# Patient Record
Sex: MALE | Race: WHITE | NOT HISPANIC OR LATINO | Employment: FULL TIME | URBAN - METROPOLITAN AREA
[De-identification: names, ages, dates, MRNs, and addresses within clinical notes are randomized per-mention and may not be internally consistent; named-entity substitution may affect disease eponyms.]

---

## 2020-12-23 ENCOUNTER — TRANSFERRED RECORDS (OUTPATIENT)
Dept: HEALTH INFORMATION MANAGEMENT | Facility: CLINIC | Age: 45
End: 2020-12-23

## 2021-01-15 ENCOUNTER — TRANSFERRED RECORDS (OUTPATIENT)
Dept: HEALTH INFORMATION MANAGEMENT | Facility: CLINIC | Age: 46
End: 2021-01-15

## 2021-01-28 ENCOUNTER — TRANSFERRED RECORDS (OUTPATIENT)
Dept: HEALTH INFORMATION MANAGEMENT | Facility: CLINIC | Age: 46
End: 2021-01-28

## 2021-02-09 ENCOUNTER — TRANSCRIBE ORDERS (OUTPATIENT)
Dept: OTHER | Age: 46
End: 2021-02-09

## 2021-02-09 DIAGNOSIS — G45.9 TIA (TRANSIENT ISCHEMIC ATTACK): Primary | ICD-10-CM

## 2021-03-03 ENCOUNTER — PRE VISIT (OUTPATIENT)
Dept: NEUROLOGY | Facility: CLINIC | Age: 46
End: 2021-03-03

## 2021-03-03 NOTE — TELEPHONE ENCOUNTER
FUTURE VISIT INFORMATION      FUTURE VISIT INFORMATION:    Date: 3/8/2021    Time: 430pm    Location: [facility]  REFERRAL INFORMATION:    Referring provider:  Dr. Holbrook    Referring providers clinic:  [facility]     Reason for visit/diagnosis  TIA    RECORDS REQUESTED FROM:       Clinic name Comments Records Status Imaging Status   [facility]   Requested  Requested                                    3/3/2021-Request for records and Images faxed to [facility] @ 351pm    3/8/2021-2nd request for Records and Images faxed to [facility], Voicemail left with Medical Records at [facility] @ 520am    Action 3/11/21 MV 4.19pm   Action Taken Imaging disk received from [facility] via mail. Sent to  for processing. Scanned in records to chart.    Images on disk:  MRI Brain 1/15/21

## 2021-03-26 ENCOUNTER — OFFICE VISIT (OUTPATIENT)
Dept: NEUROLOGY | Facility: CLINIC | Age: 46
End: 2021-03-26
Attending: PSYCHIATRY & NEUROLOGY
Payer: COMMERCIAL

## 2021-03-26 ENCOUNTER — TELEPHONE (OUTPATIENT)
Dept: NEUROLOGY | Facility: CLINIC | Age: 46
End: 2021-03-26

## 2021-03-26 VITALS
HEART RATE: 79 BPM | OXYGEN SATURATION: 97 % | RESPIRATION RATE: 16 BRPM | DIASTOLIC BLOOD PRESSURE: 89 MMHG | HEIGHT: 67 IN | BODY MASS INDEX: 36.57 KG/M2 | SYSTOLIC BLOOD PRESSURE: 137 MMHG | WEIGHT: 233 LBS

## 2021-03-26 DIAGNOSIS — G44.209 TENSION HEADACHE: ICD-10-CM

## 2021-03-26 DIAGNOSIS — Z82.3 FAMILY HISTORY OF STROKE (CEREBROVASCULAR): ICD-10-CM

## 2021-03-26 DIAGNOSIS — R29.818 TRANSIENT NEUROLOGICAL SYMPTOMS: Primary | ICD-10-CM

## 2021-03-26 PROCEDURE — 99204 OFFICE O/P NEW MOD 45 MIN: CPT | Performed by: PSYCHIATRY & NEUROLOGY

## 2021-03-26 RX ORDER — ALBUTEROL SULFATE 90 UG/1
1-2 AEROSOL, METERED RESPIRATORY (INHALATION)
COMMUNITY
Start: 2021-03-10

## 2021-03-26 RX ORDER — ASPIRIN 325 MG
325 TABLET ORAL
COMMUNITY
Start: 2020-12-23

## 2021-03-26 RX ORDER — BECLOMETHASONE DIPROPIONATE HFA 80 UG/1
1 AEROSOL, METERED RESPIRATORY (INHALATION)
COMMUNITY
Start: 2021-02-26

## 2021-03-26 RX ORDER — ATORVASTATIN CALCIUM 20 MG/1
20 TABLET, FILM COATED ORAL
COMMUNITY
Start: 2020-12-29

## 2021-03-26 RX ORDER — GABAPENTIN 100 MG/1
CAPSULE ORAL
Qty: 180 CAPSULE | Refills: 1 | Status: SHIPPED | OUTPATIENT
Start: 2021-03-26 | End: 2021-04-30 | Stop reason: DRUGHIGH

## 2021-03-26 ASSESSMENT — MIFFLIN-ST. JEOR: SCORE: 1895.51

## 2021-03-26 ASSESSMENT — PAIN SCALES - GENERAL: PAINLEVEL: MILD PAIN (3)

## 2021-03-26 NOTE — TELEPHONE ENCOUNTER
GENETIC COUNSELING-Neurology  I left a message for Abhay to schedule genetic consult for family history of CADASIL at the request of Dr. Vernon. I offered appointment times next Friday 4/2 and asked him to call me back.    Kelby Galicia MS, [facility]  Certified Genetic Couselor

## 2021-03-26 NOTE — LETTER
3/26/2021       RE: Abhay Escudero  [address] [place] [address]     Dear Colleague,    Thank you for referring your patient, Abhay Escudero, to the [facility] NEUROLOGY CLINIC [place] at [facility]. Please see a copy of my visit note below.    Service Date: 2021      Alberto Lagos, [facility]   [address] [place]  [address]      RE: Abhay Escudero   MRN: [id number]   : 1975      Dear Dr. Lagos:      I saw your patient, Abhay Escudero on 2021.  He is a 46-year-old left-handed male referred for evaluation of transient neurologic events with headache.  He also had an abnormal brain MRI scan.      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  He did recover from this.      On 2020, he had a transient left-sided sensory disturbance.  It started in his hand and then spread up to his jaw and tongue.  This lasted about 10-15 minutes and he did get a headache afterwards.  He was not confused with this.  He did go to [facility].  He was evaluated for possible transient ischemic attack.  He had a negative head CAT scan at that time and CT angiograms of the head and neck vessels were normal.  He was started on aspirin and atorvastatin.  He may have had an episode a week earlier.  While notes indicate that it was his right hand, he cannot recall specifically if it was his right or his left hand.      He was subsequently seen at the [facility] by Dr. Barbie Holbrook.  He had a brain MRI scan that I was able to review.  He has encephalomalacia and gliosis involving the right frontal lobe and superior temporal gyrus.  This I suspect is residual from his previous intracerebral hemorrhage.  There are also supratentorial white matter changes that likely represent chronic small vessel  ischemic change.  I do not appreciate any anterior temporal lesions.  There is no evidence of an acute stroke or hemorrhage on the study.      Subsequent testing included a negative transthoracic echocardiogram.  He also had a 30-day ACT monitor placed.  I do not have the actual results, but I do have a note from [facility]--Dr. Hunter.  There is no mention of atrial fibrillation, but he did have a wide complex ventricular tachycardia and he is going to be further evaluated for that apparently.      He had another event on 03/13/2021.  It started in the ulnar aspect of his left hand and then spread to the whole hand and then up his arm to his lips, gum and tongue and then his trunk.  He had a headache after this.  He went to [facility] and had a head CAT scan done and there was no evidence of any acute abnormalities.  Since then, he has had a pressure sensation in the top of his head.      He does have a history of a severe headache about 7 years ago that he thinks may have been a migraine.  It was so bad he blacked out with this.      His family history is relevant.  His mother and sister have both been diagnosed with CADASIL.  His mother had strokes.  His sister apparently had abnormalities on her brain MRI scan that were initially felt to represent multiple sclerosis, but she was subsequently, according to the patient, tested positive for CADASIL.  He also has a maternal aunt with dementia and a maternal grandmother who was diagnosed with Alzheimer disease.      PAST MEDICAL HISTORY:  Otherwise, notable for cholecystectomy, left shoulder surgery, and asthma.      CURRENT MEDICATIONS:   1.  Albuterol.   2.  Aspirin 325 mg.   3.  Atorvastatin 20 mg.   4.  Qvar.      ALLERGIES:  No known medical allergies.      FAMILY HISTORY:  As noted above.      SOCIAL HISTORY:  He is currently out of work.  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  He quit using tobacco in December.  He is not using  alcohol currently.      PHYSICAL EXAMINATION:  Examination reveals the patient was alert and cooperative.  Heart rate 79 and regular.  Blood pressure 137/89.      Pupils are equal, round and react well to light.  He has full extraocular motility.  Visual fields are intact.  Otherwise, cranial nerves II-XII are intact.  Motor examination reveals normal strength.  Sensory examination is intact to position, vibration and double simultaneous sensory stimuli.  Cerebellar function is normal.  Gait is normal.  Reflexes are 2+.  Plantar responses are flexor.      IMPRESSION:   1.  Transient neurologic symptoms (sensory) followed by headache.   2.  Abnormal brain MRI scan revealing residual right hemisphere changes from intracerebral hemorrhage as well as chronic small vessel disease.   3.  Family history of CADASIL.      PLAN:  I did discuss further evaluation.  The transient events could well represent migraine.  I think less likely they represent simple partial seizures, but this is a possibility.  There is a concern about CADASIL given his family history.      For further evaluation, he is going to have CADASIL gene testing done.  He is aware that this is an inherited disorder, and given that it is autosomal dominant, he does have a 50% chance of inheriting this disorder.  He would like to  pursue this testing and I am referring him to our , Kelby Galicia, for that purpose.      He is going to have a 3-hour EEG done looking to see if there is any evidence of epileptiform activity arising from the right hemisphere.      He reports low-grade ongoing headaches since the event a couple weeks ago.  I have prescribed gabapentin starting at 100 mg 3 times a day with the latitude to increase to 200 mg 3 times a day after a week.  I reviewed potential side effects.      He should continue on aspirin and atorvastatin.      I did  him that attempts should be made to keep his blood pressure less than 130/80.      I am  going to be seeing him back in a month.      Sincerely,      Serjio Vernon MD      Total visit time was 50 minutes.  This included review of his available medical records prior to the visit, history, examination, counseling, care coordination and documentation.         SERJIO VERNON MD             D: 2021   T: 2021   MT: chiara      Name:     SHAILESH PICKETT   MRN:      [id number]        Account:      [id number]   :      1975           Service Date: 2021      Document: [id number]

## 2021-03-26 NOTE — LETTER
3/26/2021       RE: bAhay Escudero  [address] [place] [address]     Dear Colleague,    Thank you for referring your patient, Abhay Escudero, to the [facility] NEUROLOGY CLINIC [place] at [facility]. Please see a copy of my visit note below.    Service Date: 2021      Alberto Lagos, [facility]   [address] [place]  [address]      RE: Abhay Escudero   MRN: [id number]   : 1975      Dear Dr. Lagos:      I saw your patient, Abhay Escudero on 2021.  He is a 46-year-old left-handed male referred for evaluation of transient neurologic events with headache.  He also had an abnormal brain MRI scan.      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  He did recover from this.      On 2020, he had a transient left-sided sensory disturbance.  It started in his hand and then spread up to his jaw and tongue.  This lasted about 10-15 minutes and he did get a headache afterwards.  He was not confused with this.  He did go to [facility].  He was evaluated for possible transient ischemic attack.  He had a negative head CAT scan at that time and CT angiograms of the head and neck vessels were normal.  He was started on aspirin and atorvastatin.  He may have had an episode a week earlier.  While notes indicate that it was his right hand, he cannot recall specifically if it was his right or his left hand.      He was subsequently seen at the [facility] by Dr. Barbie Holbrook.  He had a brain MRI scan that I was able to review.  He has encephalomalacia and gliosis involving the right frontal lobe and superior temporal gyrus.  This I suspect is residual from his previous intracerebral hemorrhage.  There are also supratentorial white matter changes that likely represent chronic small vessel  ischemic change.  I do not appreciate any anterior temporal lesions.  There is no evidence of an acute stroke or hemorrhage on the study.      Subsequent testing included a negative transthoracic echocardiogram.  He also had a 30-day ACT monitor placed.  I do not have the actual results, but I do have a note from [facility]--Dr. Hunter.  There is no mention of atrial fibrillation, but he did have a wide complex ventricular tachycardia and he is going to be further evaluated for that apparently.      He had another event on 03/13/2021.  It started in the ulnar aspect of his left hand and then spread to the whole hand and then up his arm to his lips, gum and tongue and then his trunk.  He had a headache after this.  He went to [facility] and had a head CAT scan done and there was no evidence of any acute abnormalities.  Since then, he has had a pressure sensation in the top of his head.      He does have a history of a severe headache about 7 years ago that he thinks may have been a migraine.  It was so bad he blacked out with this.      His family history is relevant.  His mother and sister have both been diagnosed with CADASIL.  His mother had strokes.  His sister apparently had abnormalities on her brain MRI scan that were initially felt to represent multiple sclerosis, but she was subsequently, according to the patient, tested positive for CADASIL.  He also has a maternal aunt with dementia and a maternal grandmother who was diagnosed with Alzheimer disease.      PAST MEDICAL HISTORY:  Otherwise, notable for cholecystectomy, left shoulder surgery, and asthma.      CURRENT MEDICATIONS:   1.  Albuterol.   2.  Aspirin 325 mg.   3.  Atorvastatin 20 mg.   4.  Qvar.      ALLERGIES:  No known medical allergies.      FAMILY HISTORY:  As noted above.      SOCIAL HISTORY:  He is currently out of work.  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  He quit using tobacco in December.  He is not using  alcohol currently.      PHYSICAL EXAMINATION:  Examination reveals the patient was alert and cooperative.  Heart rate 79 and regular.  Blood pressure 137/89.      Pupils are equal, round and react well to light.  He has full extraocular motility.  Visual fields are intact.  Otherwise, cranial nerves II-XII are intact.  Motor examination reveals normal strength.  Sensory examination is intact to position, vibration and double simultaneous sensory stimuli.  Cerebellar function is normal.  Gait is normal.  Reflexes are 2+.  Plantar responses are flexor.      IMPRESSION:   1.  Transient neurologic symptoms (sensory) followed by headache.   2.  Abnormal brain MRI scan revealing residual right hemisphere changes from intracerebral hemorrhage as well as chronic small vessel disease.   3.  Family history of CADASIL.      PLAN:  I did discuss further evaluation.  The transient events could well represent migraine.  I think less likely they represent simple partial seizures, but this is a possibility.  There is a concern about CADASIL given his family history.      For further evaluation, he is going to have CADASIL gene testing done.  He is aware that this is an inherited disorder, and given that it is autosomal dominant, he does have a 50% chance of inheriting this disorder.  He would like to  pursue this testing and I am referring him to our , Kelby Galicia, for that purpose.      He is going to have a 3-hour EEG done looking to see if there is any evidence of epileptiform activity arising from the right hemisphere.      He reports low-grade ongoing headaches since the event a couple weeks ago.  I have prescribed gabapentin starting at 100 mg 3 times a day with the latitude to increase to 200 mg 3 times a day after a week.  I reviewed potential side effects.      He should continue on aspirin and atorvastatin.      I did  him that attempts should be made to keep his blood pressure less than 130/80.      I am  going to be seeing him back in a month.      Sincerely,      Serjio Vernon MD      Total visit time was 50 minutes.  This included review of his available medical records prior to the visit, history, examination, counseling, care coordination and documentation.         SERJIO VERNON MD             D: 2021   T: 2021   MT: chiara      Name:     SHAILESH PICKETT   MRN:      [id number]        Account:      [id number]   :      1975           Service Date: 2021      Document: [id number]

## 2021-03-26 NOTE — PROGRESS NOTES
Service Date: 2021      Alberto Lagos, [facility]   [address] [place]  [address]      RE: Abhay Escudero   MRN: [id number]   : 1975      Dear Dr. Lagos:      I saw your patient, Abhay Escudero on 2021.  He is a 46-year-old left-handed male referred for evaluation of transient neurologic events with headache.  He also had an abnormal brain MRI scan.      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  He did recover from this.      On 2020, he had a transient left-sided sensory disturbance.  It started in his hand and then spread up to his jaw and tongue.  This lasted about 10-15 minutes and he did get a headache afterwards.  He was not confused with this.  He did go to [facility].  He was evaluated for possible transient ischemic attack.  He had a negative head CAT scan at that time and CT angiograms of the head and neck vessels were normal.  He was started on aspirin and atorvastatin.  He may have had an episode a week earlier.  While notes indicate that it was his right hand, he cannot recall specifically if it was his right or his left hand.      He was subsequently seen at the [facility] by Dr. Barbie Holbrook.  He had a brain MRI scan that I was able to review.  He has encephalomalacia and gliosis involving the right frontal lobe and superior temporal gyrus.  This I suspect is residual from his previous intracerebral hemorrhage.  There are also supratentorial white matter changes that likely represent chronic small vessel ischemic change.  I do not appreciate any anterior temporal lesions.  There is no evidence of an acute stroke or hemorrhage on the study.      Subsequent testing included a negative transthoracic echocardiogram.  He also had a 30-day ACT monitor placed.  I do not have the actual results, but I do have a note from [facility]--Dr. Hunter.  There is no mention of atrial fibrillation, but he did have a wide complex ventricular tachycardia and he is going to be further evaluated for that apparently.      He had another event on 03/13/2021.  It started in the ulnar aspect of his left hand and then spread to the whole hand and then up his arm to his lips, gum and tongue and then his trunk.  He had a headache after this.  He went to [facility] and had a head CAT scan done and there was no evidence of any acute abnormalities.  Since then, he has had a pressure sensation in the top of his head.      He does have a history of a severe headache about 7 years ago that he thinks may have been a migraine.  It was so bad he blacked out with this.      His family history is relevant.  His mother and sister have both been diagnosed with CADASIL.  His mother had strokes.  His sister apparently had abnormalities on her brain MRI scan that were initially felt to represent multiple sclerosis, but she was subsequently, according to the patient, tested positive for CADASIL.  He also has a maternal aunt with dementia and a maternal grandmother who was diagnosed with Alzheimer disease.      PAST MEDICAL HISTORY:  Otherwise, notable for cholecystectomy, left shoulder surgery, and asthma.      CURRENT MEDICATIONS:   1.  Albuterol.   2.  Aspirin 325 mg.   3.  Atorvastatin 20 mg.   4.  Qvar.      ALLERGIES:  No known medical allergies.      FAMILY HISTORY:  As noted above.      SOCIAL HISTORY:  He is currently out of work.  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  He is not using alcohol currently.      PHYSICAL EXAMINATION:  Examination reveals the patient was alert and cooperative.  Heart rate 79 and regular.  Blood pressure 137/89.      Pupils are equal, round and react well to light.  He has full extraocular motility.  Visual fields are intact.  Otherwise, cranial nerves II-XII are intact.   Motor examination reveals normal strength.  Sensory examination is intact to position, vibration and double simultaneous sensory stimuli.  Cerebellar function is normal.  Gait is normal.  Reflexes are 2+.  Plantar responses are flexor.      IMPRESSION:   1.  Transient neurologic symptoms (sensory) followed by headache.   2.  Abnormal brain MRI scan revealing residual right hemisphere changes from intracerebral hemorrhage as well as chronic small vessel disease.   3.  Family history of CADASIL.      PLAN:  I did discuss further evaluation.  The transient events could well represent migraine.  I think less likely they represent simple partial seizures, but this is a possibility.  There is a concern about CADASIL given his family history.      For further evaluation, he is going to have CADASIL gene testing done.  He is aware that this is an inherited disorder, and given that it is autosomal dominant, he does have a 50% chance of inheriting this disorder.  He would like to  pursue this testing and I am referring him to our , Kelby Galicia, for that purpose.      He is going to have a 3-hour EEG done looking to see if there is any evidence of epileptiform activity arising from the right hemisphere.      He reports low-grade ongoing headaches since the event a couple weeks ago.  I have prescribed gabapentin starting at 100 mg 3 times a day with the latitude to increase to 200 mg 3 times a day after a week.  I reviewed potential side effects.      He should continue on aspirin and atorvastatin.      I did  him that attempts should be made to keep his blood pressure less than 130/80.      I am going to be seeing him back in a month.      Sincerely,      Serjio Vernon MD      Total visit time was 50 minutes.  This included review of his available medical records prior to the visit, history, examination, counseling, care coordination and documentation.         SERJIO VERNON MD             D: 03/26/2021   T:  2021   MT: chiara      Name:     SHAILESH PICKETT   MRN:      [id number]        Account:      [id number]   :      1975           Service Date: 2021      Document: [id number]

## 2021-03-29 ENCOUNTER — TELEPHONE (OUTPATIENT)
Dept: NEUROLOGY | Facility: CLINIC | Age: 46
End: 2021-03-29

## 2021-03-29 NOTE — TELEPHONE ENCOUNTER
GENETIC COUNSELIG-Neurology  I left a second message for Abhay offering appointment times this Friday or Friday April 16.      Kelby Galicia MS, [facility]  Certified Genetic Counselor

## 2021-04-28 ENCOUNTER — TELEPHONE (OUTPATIENT)
Dept: NEUROLOGY | Facility: CLINIC | Age: 46
End: 2021-04-28

## 2021-04-28 NOTE — TELEPHONE ENCOUNTER
I called pt to check if he had done his EEG outside of [place]. If so we would like to get the results prior to his follow up Friday with dr. Vernon. Pt said he has not scheduled this test yet. I asked if he would like for me to have our  call him to help set up testing. He agreed and will expect the call. I will update Dr. Vernon.     Mely MORALES

## 2021-04-30 ENCOUNTER — OFFICE VISIT (OUTPATIENT)
Dept: NEUROLOGY | Facility: CLINIC | Age: 46
End: 2021-04-30
Payer: COMMERCIAL

## 2021-04-30 VITALS
HEART RATE: 100 BPM | DIASTOLIC BLOOD PRESSURE: 90 MMHG | BODY MASS INDEX: 37.12 KG/M2 | SYSTOLIC BLOOD PRESSURE: 132 MMHG | RESPIRATION RATE: 16 BRPM | WEIGHT: 237 LBS | OXYGEN SATURATION: 96 %

## 2021-04-30 DIAGNOSIS — G44.209 TENSION HEADACHE: ICD-10-CM

## 2021-04-30 PROCEDURE — 99213 OFFICE O/P EST LOW 20 MIN: CPT | Performed by: PSYCHIATRY & NEUROLOGY

## 2021-04-30 RX ORDER — GABAPENTIN 300 MG/1
300 CAPSULE ORAL 3 TIMES DAILY
Qty: 90 CAPSULE | Refills: 6 | Status: SHIPPED | OUTPATIENT
Start: 2021-04-30 | End: 2021-05-26

## 2021-04-30 ASSESSMENT — PAIN SCALES - GENERAL: PAINLEVEL: MILD PAIN (3)

## 2021-04-30 NOTE — PATIENT INSTRUCTIONS
Increase the Gabapentin to 300 mg 3 times a day  You can use up your 100 mg capsules by taking 3 of them 3 times a day  Your new Gabapentin prescription is for 300 mg capsules so you would take one of these 3 times a day

## 2021-04-30 NOTE — PROGRESS NOTES
"Service Date: 04/30/2021    REASON FOR VISIT:  Shailesh Ryder returns for followup.  He is a patient I saw a month ago for transient neurologic events and an abnormal brain MRI scan.  There is a family history of CADASIL.    His brain MRI scan did show evidence of a prior right intracerebral hemorrhage as well as chronic small vessel changes.  His transient neurologic symptoms were mainly sensory followed by headache and I suspected these were migrainous in nature.    The plan was for him to have a 3-hour EEG to rule out partial seizures, but he had to cancel that appointment, but it has been rescheduled for 05/20/2021.    He has not followed through with the CADASIL testing.  He was referred to our , Kelby Galicia for that purpose.  He does have Kelby Galicia's number and is going to be contacting him.    After his visit with me, he decided to try gabapentin because he was having low-grade headaches.  He is now on 200 mg 3 times a day and he describes as headaches now as \"light.\" He has not had any of the sensory symptoms with these and seems to be tolerating the gabapentin well.    PLAN:  He is going to try increasing the gabapentin to 300 mg 3 times a day.    He does have a 3-hour EEG scheduled for 05/20/2021, and I will communicate that result to him.    He is going to get in touch with Kelby Galicia concerning CADASIL testing.    I am going to be seeing him back formally in 2 months.    Total visit time today was 15 minutes.  This included reviewing history, counseling and documentation.    ADDENDUM 5/26/21: EEG normal. Called patient with results. Headaches good but now has edema hands and feet likely due to Gabapentin. Will reduce dose to 300 mg twice a day. He has F/U with me on July 2 . He has not, as of yet, scheduled appointment with Kelby Galicia.     Serjio Vernon MD        D: 04/30/2021   T: 04/30/2021   MT: NICHELLE    Name:     SHAILESH PICKETT  MRN:      [id number]        Account:      [id number]   :      1975           Service Date: 2021       Document: [id number]

## 2021-04-30 NOTE — LETTER
"4/30/2021       RE: Shailesh Escudero  [address]  [place] [address]     Dear Colleague,    Thank you for referring your patient, Shailesh Escudero, to the [facility] NEUROLOGY CLINIC [place] at [facility]. Please see a copy of my visit note below.    Service Date: 04/30/2021    REASON FOR VISIT:  Shailesh Ryder returns for followup.  He is a patient I saw a month ago for transient neurologic events and an abnormal brain MRI scan.  There is a family history of CADASIL.    His brain MRI scan did show evidence of a prior right intracerebral hemorrhage as well as chronic small vessel changes.  His transient neurologic symptoms were mainly sensory followed by headache and I suspected these were migrainous in nature.    The plan was for him to have a 3-hour EEG to rule out partial seizures, but he had to cancel that appointment, but it has been rescheduled for 05/20/2021.    He has not followed through with the CADASIL testing.  He was referred to our , Kelby Galicia for that purpose.  He does have Kelby Galicia's number and is going to be contacting him.    After his visit with me, he decided to try gabapentin because he was having low-grade headaches.  He is now on 200 mg 3 times a day and he describes as headaches now as \"light.\" He has not had any of the sensory symptoms with these and seems to be tolerating the gabapentin well.    PLAN:  He is going to try increasing the gabapentin to 300 mg 3 times a day.    He does have a 3-hour EEG scheduled for 05/20/2021, and I will communicate that result to him.    He is going to get in touch with Kelby Galicia concerning CADASIL testing.    I am going to be seeing him back formally in 2 months.    Total visit time today was 15 minutes.  This included reviewing history, counseling and documentation.    Serjio Vernon MD        D: 04/30/2021   T: 04/30/2021   MT: NICHELLE    Name:     SHAILESH ESCUDERO.  MRN:      " [id number]        Account:      [id number]   :      1975           Service Date: 2021       Document: [id number]

## 2021-05-20 ENCOUNTER — ANCILLARY PROCEDURE (OUTPATIENT)
Dept: NEUROLOGY | Facility: CLINIC | Age: 46
End: 2021-05-20
Attending: PSYCHIATRY & NEUROLOGY
Payer: COMMERCIAL

## 2021-05-20 DIAGNOSIS — R29.818 TRANSIENT NEUROLOGICAL SYMPTOMS: ICD-10-CM

## 2021-05-20 PROCEDURE — 95713 VEEG 2-12 HR CONT MNTR: CPT | Performed by: PSYCHIATRY & NEUROLOGY

## 2021-05-20 PROCEDURE — 95718 EEG PHYS/QHP 2-12 HR W/VEEG: CPT | Performed by: PSYCHIATRY & NEUROLOGY

## 2021-05-20 PROCEDURE — 95700 EEG CONT REC W/VID EEG TECH: CPT | Performed by: PSYCHIATRY & NEUROLOGY

## 2021-05-26 DIAGNOSIS — G44.209 TENSION HEADACHE: ICD-10-CM

## 2021-05-26 RX ORDER — GABAPENTIN 300 MG/1
300 CAPSULE ORAL
Qty: 90 CAPSULE | Refills: 6 | Status: SHIPPED | OUTPATIENT
Start: 2021-05-26 | End: 2021-07-02 | Stop reason: SINTOL

## 2021-07-02 ENCOUNTER — OFFICE VISIT (OUTPATIENT)
Dept: NEUROLOGY | Facility: CLINIC | Age: 46
End: 2021-07-02

## 2021-07-02 VITALS
SYSTOLIC BLOOD PRESSURE: 149 MMHG | BODY MASS INDEX: 38.87 KG/M2 | HEART RATE: 76 BPM | RESPIRATION RATE: 16 BRPM | OXYGEN SATURATION: 97 % | WEIGHT: 248.2 LBS | DIASTOLIC BLOOD PRESSURE: 94 MMHG

## 2021-07-02 DIAGNOSIS — G43.119 INTRACTABLE MIGRAINE WITH AURA WITHOUT STATUS MIGRAINOSUS: Primary | ICD-10-CM

## 2021-07-02 PROCEDURE — 99213 OFFICE O/P EST LOW 20 MIN: CPT | Performed by: PSYCHIATRY & NEUROLOGY

## 2021-07-02 RX ORDER — TOPIRAMATE 25 MG/1
TABLET, FILM COATED ORAL
Qty: 90 TABLET | Refills: 2 | Status: SHIPPED | OUTPATIENT
Start: 2021-07-02 | End: 2021-09-10

## 2021-07-02 ASSESSMENT — PAIN SCALES - GENERAL: PAINLEVEL: NO PAIN (0)

## 2021-07-02 NOTE — PROGRESS NOTES
Service Date: 2021    Alberto Lagos, [facility]  [address] [place]  [address]    RE:  Abhay Escudero  MRN:  [id number]  :  1975    Dear Dr. Lagos:    Abhay Escudero returns for followup.  He is a patient who has had at least 3 episodes of transient sensory disturbance followed by headache.  At least 2 of these were on the left side in the past.  Initial concerns were possible transient ischemic attack.  Workup for this was unrevealing.  He also has a history of right intracerebral hemorrhage in .  Brain MRI scan showed encephalomalacia and gliosis involving the right frontal lobe and superior temporal gyrus likely related to his previous intracerebral hemorrhage as well as supratentorial white matter changes.  It is notable there is a positive family history of CADASIL affecting his mother and sister.  He has not yet had genetic testing for this.    Since I last saw him, he had a 3-hour EEG done and the study was normal.    He tells me he had another episode about a month ago. On this occasion, it was right sided.  He had numbness starting in his right hand and then gradually spreading up his right arm.  This lasted about 10-15 minutes and he had a headache afterwards.  He is still having frequent pressure headaches.    He is on gabapentin, but he is gaining weight and is troubled by edema.  His current dose is 300 mg twice a day.    Other medications include albuterol, aspirin, atorvastatin, and Qvar.    PHYSICAL EXAMINATION:  Examination reveals his heart rate is 76.  Blood pressure 149/94.    Pupils are equal, round, and react well to light.  Visual fields are intact.  Otherwise, cranial nerves II-XII are intact.  Motor examination reveals normal strength.  Sensory examination is intact.  There is no evidence of cortical sensory loss.  Cerebellar function is normal.  Gait is normal.  Reflexes are 1 to 2+ in the upper extremities and 2+ in the lower  extremities.  Plantar responses are flexor.    IMPRESSION:    1.  Recurrent sensory disturbance with headache, which I suspect are migrainous phenomenon.  2.  Family history of CADASIL.    PLAN:  He is having side effects from gabapentin that he finds intolerable.  He is going to reduce the dose to 300 mg a day for a week and then stop.    I discussed a trial of Topamax.  I reviewed potential side effects.  He does not have any absolute contraindication such as glaucoma or kidney stones.    He is going to start on Topamax 25 mg a day for a week and then he can go to 50 mg a day for a week and then 75 mg.    I am retiring after today.  I am going to refer him to the Headache Clinic for ongoing management.    He does plan at some point to get in touch with Kelby Galicia regarding CADASIL testing.    I asked him to follow-up with you regarding his blood pressure.     Total visit time today was 20 minutes.  This included reviewing history, examination, counseling, and documentation.    Sincerely,    Serjio Vernon MD    cc:  Barbie Holbrook MD  [facility]  [address] [place]  [address]    Serjio Vernon MD        D: 2021   T: 2021   MT: chiara    Name:     SHAILESH PICKETT  MRN:      [id number]        Account:      [id number]   :      1975           Service Date: 2021       Document: [id number]

## 2021-07-02 NOTE — LETTER
2021       RE: Abhay Escudero  [address]  [place] [address]     Dear Colleague,    Thank you for referring your patient, Abhay Escudero, to the [facility] NEUROLOGY CLINIC [place] at [facility]. Please see a copy of my visit note below.    Service Date: 2021    Alberto Lagos, [facility]  [address] [place]  [address]    RE:  Abhay Escudero  MRN:  [id number]  :  1975    Dear Dr. Lagos:    Abhay Escudero returns for followup.  He is a patient who has had at least 3 episodes of transient sensory disturbance followed by headache.  At least 2 of these were on the left side in the past.  Initial concerns were possible transient ischemic attack.  Workup for this was unrevealing.  He also has a history of right intracerebral hemorrhage in .  Brain MRI scan showed encephalomalacia and gliosis involving the right frontal lobe and superior temporal gyrus likely related to his previous intracerebral hemorrhage as well as supratentorial white matter changes.  It is notable there is a positive family history of CADASIL affecting his mother and sister.  He has not yet had genetic testing for this.    Since I last saw him, he had a 3-hour EEG done and the study was normal.    He tells me he had another episode about a month ago. On this occasion, it was right sided.  He had numbness starting in his right hand and then gradually spreading up his right arm.  This lasted about 10-15 minutes and he had a headache afterwards.  He is still having frequent pressure headaches.    He is on gabapentin, but he is gaining weight and is troubled by edema.  His current dose is 300 mg twice a day.    Other medications include albuterol, aspirin, atorvastatin, and Qvar.    PHYSICAL EXAMINATION:  Examination reveals his heart rate is 76.  Blood pressure 149/94.    Pupils are equal, round, and react well to  light.  Visual fields are intact.  Otherwise, cranial nerves II-XII are intact.  Motor examination reveals normal strength.  Sensory examination is intact.  There is no evidence of cortical sensory loss.  Cerebellar function is normal.  Gait is normal.  Reflexes are 1 to 2+ in the upper extremities and 2+ in the lower extremities.  Plantar responses are flexor.    IMPRESSION:    1.  Recurrent sensory disturbance with headache, which I suspect are migrainous phenomenon.  2.  Family history of CADASIL.    PLAN:  He is having side effects from gabapentin that he finds intolerable.  He is going to reduce the dose to 300 mg a day for a week and then stop.    I discussed a trial of Topamax.  I reviewed potential side effects.  He does not have any absolute contraindication such as glaucoma or kidney stones.    He is going to start on Topamax 25 mg a day for a week and then he can go to 50 mg a day for a week and then 75 mg.    I am retiring after today.  I am going to refer him to the Headache Clinic for ongoing management.    He does plan at some point to get in touch with Kelby Galicia regarding CADASIL testing.    I asked him to follow-up with you regarding his blood pressure.     Total visit time today was 20 minutes.  This included reviewing history, examination, counseling, and documentation.    Sincerely,    Serjio Vernon MD    cc:  Barbie Holbrook MD  [facility]  [address] [place]  [address]      D: 2021   T: 2021   MT: chiara  Name:     SHAILESH PICKETT  MRN:      [id number]        Account:      [id number]   :      1975           Service Date: 2021   Document: [id number]

## 2021-07-02 NOTE — NURSING NOTE
Chief Complaint   Patient presents with     RECHECK     [employer] RETURN- TIA     Tia (Transient Ischemic Attack)     Delma Helton

## 2021-08-05 ENCOUNTER — TELEPHONE (OUTPATIENT)
Dept: NEUROLOGY | Facility: CLINIC | Age: 46
End: 2021-08-05

## 2021-08-05 NOTE — TELEPHONE ENCOUNTER
[facility] Call Center    Phone Message    May a detailed message be left on voicemail: yes     Reason for Call: Medication Question or concern regarding medication   Prescription Clarification  Name of Medication: topiramate (TOPAMAX) 25 MG tablet  Prescribing Provider: Dr. Casillas   Pharmacy: NA   What on the order needs clarification? Pt is to start new job and medication makes him drowsy. He is wondering if he can reduce dose per day.    Please call Pt back to discuss and advise.          Action Taken: Message routed to:  [facility] ([facility] Neurology    Travel Screening: Not Applicable

## 2021-08-09 NOTE — TELEPHONE ENCOUNTER
"He has lost 8 pounds since stopping gabapentin, he is 241 lbs. He will try and stop the topamax 25 mg in AM to see if this helps his daytime drowsiness. He reports early afternoon he feels \"stoned.\" He will see Dr. Casillas on 9/10 to further discuss headache symptoms and treatment options.      "

## 2021-09-10 ENCOUNTER — OFFICE VISIT (OUTPATIENT)
Dept: NEUROLOGY | Facility: CLINIC | Age: 46
End: 2021-09-10

## 2021-09-10 VITALS — DIASTOLIC BLOOD PRESSURE: 73 MMHG | HEART RATE: 86 BPM | OXYGEN SATURATION: 98 % | SYSTOLIC BLOOD PRESSURE: 111 MMHG

## 2021-09-10 DIAGNOSIS — G43.119 INTRACTABLE MIGRAINE WITH AURA WITHOUT STATUS MIGRAINOSUS: ICD-10-CM

## 2021-09-10 PROCEDURE — 99215 OFFICE O/P EST HI 40 MIN: CPT | Performed by: PSYCHIATRY & NEUROLOGY

## 2021-09-10 RX ORDER — TOPIRAMATE 50 MG/1
50 TABLET, FILM COATED ORAL EVERY EVENING
Qty: 30 TABLET | Refills: 5 | Status: SHIPPED | OUTPATIENT
Start: 2021-09-10 | End: 2022-03-30

## 2021-09-10 NOTE — PROGRESS NOTES
[facility]  Neurology Consult     Abhay Escudero MRN# [id number]   YOB: 1975 Age: 46 year old     Requesting physician: Serjio Vernon MD         Assessment and Recommendations:     Abhay Escudero is a 46 year old male who presents to [facility] care for headache and neurologic symptoms.  He previously saw Dr. Vernon.    His headache presentation meets criteria for chronic migraine with sensory aura.  I suspect he has this on a genetic basis.  His sensory auras are relatively new, and further evaluation into ischemic causes was pursued.  He also underwent EEG to evaluate for seizure.  These investigations were unrevealing for new cerebral infarction or evidence of epilepsy.    Of note, there is a family history of CADASIL in his mother and sister.  He is interested in genetic testing for the condition himself, although would like to hold off on this for now.  -We reviewed the condition briefly and the potential associated features, including stroke, TIA, dementia, headache, anxiety.     We discussed a symptomatic treatment strategy for chronic migraine.  -For acute treatment, he will continue naproxen 220 mg as needed, not to exceed more than 14 days/month to avoid medication overuse.    His headache frequency and severity warrant prevention.  He will continue topiramate at 50 mg nightly for the time being.  He is not able to tolerate higher doses due to side effects.  -If topiramate is not tolerated or not effective in the future, verapamil, amitriptyline, a CGRP monoclonal antibody, or botulinum toxin injections could be considered.    I spent over 40 minutes on patient care and documentation today.    Milagros Casillas MD  Neurology            Chief Complaint:     Chief Complaint   Patient presents with     New Patient     [facility] NEW           History is obtained from the patient and medical record.      Abhay Escudero is a 46 year old male who  "has been living with headaches \"all my life\".     He has had 4-5 episodes of sensory deficits associated with headaches since 2020. He describes numbness in his fingertips spreading to his lips, tongue, and jaw over 10-15 minutes, followed by a headache. These are typically on the left, sometimes on the right side.     Bifrontal, top of head pain that is severe. It lasts up to 4 days at a time.  He has 30/30 headache days per month, with 4/30 severe headache days per month (previously more he says).     He has associated photophobia, maybe phonophobia, nausea, rare vomiting.     For acute treatment, he takes aspirin, stays hydrated.    He tried gabapentin, but gained 40 pounds.     He tried topiramate recently. He was taking three daily and was lethargic and lost appetite. He takes 50 mg topiramate at night now. He still feels kind of dopey and forgetful. It is somewhat helpful.    He recently started working again after a shoulder surgery.    His mother (passed away last year from a large stroke, also had \"mini-strokes\" and vertigo) and sister (migraine). They have/had CADASIL. He thinks his maternal aunt (dementia) and maternal grandmother (dementia) were also affected.    He describes anxiety. He reports a previous stroke with brain bleed 16 years ago, which he attributes to drug use (meth).             Past Medical History:   Stroke - with meth use  Hyperlipidemia  Previous alcohol abuse  Previous methamphetamine abuse  Asthma           Past Surgical History:   Left shoulder surgery          Social History:   He is working as a .     He is engaged. He does not have children currently.     Social History     Socioeconomic History     Marital status: Single     Spouse name: Not on file     Number of children: Not on file     Years of education: Not on file     Highest education level: Not on file   Occupational History     Not on file   Tobacco Use     Smoking status: Former Smoker     " Quit date: 2020     Years since quittin.7     Smokeless tobacco: Never Used   Substance and Sexual Activity     Alcohol use: Not Currently     Drug use: Never     Sexual activity: Not on file   Other Topics Concern     Not on file   Social History Narrative     Not on file     Social Determinants of Health     Financial Resource Strain:      Difficulty of Paying Living Expenses:    Food Insecurity:      Worried About Running Out of Food in the Last Year:      Ran Out of Food in the Last Year:    Transportation Needs:      Lack of Transportation (Medical):      Lack of Transportation (Non-Medical):    Physical Activity:      Days of Exercise per Week:      Minutes of Exercise per Session:    Stress:      Feeling of Stress :    Social Connections:      Frequency of Communication with Friends and Family:      Frequency of Social Gatherings with Friends and Family:      Attends Congregational Services:      Active Member of Clubs or Organizations:      Attends Club or Organization Meetings:      Marital Status:    Intimate Partner Violence:      Fear of Current or Ex-Partner:      Emotionally Abused:      Physically Abused:      Sexually Abused:              Family History:   See above.          Allergies:      Allergies   Allergen Reactions     Gabapentin Swelling     Swelling in the feet             Medications:     Current Outpatient Medications:      albuterol (PROAIR HFA/PROVENTIL HFA/VENTOLIN HFA) 108 (90 Base) MCG/ACT inhaler, Inhale 1-2 puffs into the lungs, Disp: , Rfl:      aspirin (ASA) 325 MG tablet, Take 325 mg by mouth, Disp: , Rfl:      atorvastatin (LIPITOR) 20 MG tablet, Take 20 mg by mouth, Disp: , Rfl:      topiramate (TOPAMAX) 25 MG tablet, One a day x 1 week, then 1 twice a day x 1 week, then one in am and 2 at night, Disp: 90 tablet, Rfl: 2     beclomethasone HFA (QVAR REDIHALER) 80 MCG/ACT inhaler, Inhale 1 puff into the lungs (Patient not taking: Reported on 9/10/2021), Disp: , Rfl:             Physical Exam:   /73   Pulse 86   SpO2 98%    Physical Exam:   General: NAD  Neurologic:   Mental Status Exam: Alert, awake and oriented to situation. No dysarthria. Speech of normal fluency.   Cranial Nerves: Fundoscopic exam with clear disc margins bilaterally. PERRLA, EOMs intact, no nystagmus, facial movements symmetric, facial sensation intact to light touch, hearing intact to conversation, trapezius and SCMs 5/5 bilaterally, tongue midline and fully mobile. No tongue atrophy or fasciculations.    Motor: Normal tone in all four extremities, no atrophy or fasciculations. 5/5 strength bilaterally in shoulder abduction, elbow extensors and flexors, wrist extensors and flexors, hip flexors, knee extensors and flexors, dorsi- and plantarflexion. No tremors or abnormal movements noted.   Sensory: Sensation intact to light touch on arms and legs bilaterally.    Coordination: Finger-nose-finger intact bilaterally.  Rapidly alternating movements intact bilaterally in the upper extremities.  Normal finger tapping bilaterally.  Normal Romberg.   Reflexes: 2+ and symmetric in triceps, biceps, brachioradialis, patellar, Achilles, and plantars downgoing bilaterally.   Gait: Normal gait.  Able to toe and heel walk.  Tandem gait normal.  Head: Normocephalic, atraumatic. No radiating pain with palpation over the supraorbital notches, occipital nerves.  Temporal pulses intact.   Neck: Normal range of motion with lateral head movements and neck flexion.  Eyes: No conjunctival injection, no scleral icterus.   Respiratory: No increased work of breathing.  Cardiovascular: No lower extremity edema.  Extremities: Warm, dry.          Data:     MRI brain (1/18/2021):  No radiographic evidence of acute infarcts. Better visualized chronic encephalomalacia and gliosis involving the lateral right frontal lobe as well the right superior temporal gyrus. This may represent residua from distant traumatic or ischemic insult. Mild  supratentorial white matter change that is nonspecific but may be related to chronic small vessel ischemic change.    EEG normal

## 2021-09-10 NOTE — LETTER
9/10/2021       RE: Abhay Escudero  [address]  [place] [address]     Dear Colleague,    Thank you for referring your patient, Abhay Escudero, to the [facility] [place] at [facility]. Please see a copy of my visit note below.    [facility]  Neurology Consult     Abhay Escudero MRN# [id number]   YOB: 1975 Age: 46 year old     Requesting physician: Serjio Vernon MD         Assessment and Recommendations:     Abhay Escudero is a 46 year old male who presents to [facility] [place] care for headache and neurologic symptoms.  He previously saw Dr. Vernon.    His headache presentation meets criteria for chronic migraine with sensory aura.  I suspect he has this on a genetic basis.  His sensory auras are relatively new, and further evaluation into ischemic causes was pursued.  He also underwent EEG to evaluate for seizure.  These investigations were unrevealing for new cerebral infarction or evidence of epilepsy.    Of note, there is a family history of CADASIL in his mother and sister.  He is interested in genetic testing for the condition himself, although would like to hold off on this for now.  -We reviewed the condition briefly and the potential associated features, including stroke, TIA, dementia, headache, anxiety.     We discussed a symptomatic treatment strategy for chronic migraine.  -For acute treatment, he will continue naproxen 220 mg as needed, not to exceed more than 14 days/month to avoid medication overuse.    His headache frequency and severity warrant prevention.  He will continue topiramate at 50 mg nightly for the time being.  He is not able to tolerate higher doses due to side effects.  -If topiramate is not tolerated or not effective in the future, verapamil, amitriptyline, a CGRP monoclonal antibody, or botulinum toxin injections could be  "considered.    I spent over 40 minutes on patient care and documentation today.    Milagros Casillas MD  Neurology            Chief Complaint:     Chief Complaint   Patient presents with     New Patient     [facility] NEW           History is obtained from the patient and medical record.      Abhay Escudero is a 46 year old male who has been living with headaches \"all my life\".     He has had 4-5 episodes of sensory deficits associated with headaches since 2020. He describes numbness in his fingertips spreading to his lips, tongue, and jaw over 10-15 minutes, followed by a headache. These are typically on the left, sometimes on the right side.     Bifrontal, top of head pain that is severe. It lasts up to 4 days at a time.  He has 30/30 headache days per month, with 4/30 severe headache days per month (previously more he says).     He has associated photophobia, maybe phonophobia, nausea, rare vomiting.     For acute treatment, he takes aspirin, stays hydrated.    He tried gabapentin, but gained 40 pounds.     He tried topiramate recently. He was taking three daily and was lethargic and lost appetite. He takes 50 mg topiramate at night now. He still feels kind of dopey and forgetful. It is somewhat helpful.    He recently started working again after a shoulder surgery.    His mother (passed away last year from a large stroke, also had \"mini-strokes\" and vertigo) and sister (migraine). They have/had CADASIL. He thinks his maternal aunt (dementia) and maternal grandmother (dementia) were also affected.    He describes anxiety. He reports a previous stroke with brain bleed 16 years ago, which he attributes to drug use (meth).             Past Medical History:   Stroke - with meth use  Hyperlipidemia  Previous alcohol abuse  Previous methamphetamine abuse  Asthma           Past Surgical History:   Left shoulder surgery          Social History:   He is working as a .     He is engaged. He does not have " children currently.     Social History     Socioeconomic History     Marital status: Single     Spouse name: Not on file     Number of children: Not on file     Years of education: Not on file     Highest education level: Not on file   Occupational History     Not on file   Tobacco Use     Smoking status: Former Smoker     Quit date: 2020     Years since quittin.7     Smokeless tobacco: Never Used   Substance and Sexual Activity     Alcohol use: Not Currently     Drug use: Never     Sexual activity: Not on file   Other Topics Concern     Not on file   Social History Narrative     Not on file     Social Determinants of Health     Financial Resource Strain:      Difficulty of Paying Living Expenses:    Food Insecurity:      Worried About Running Out of Food in the Last Year:      Ran Out of Food in the Last Year:    Transportation Needs:      Lack of Transportation (Medical):      Lack of Transportation (Non-Medical):    Physical Activity:      Days of Exercise per Week:      Minutes of Exercise per Session:    Stress:      Feeling of Stress :    Social Connections:      Frequency of Communication with Friends and Family:      Frequency of Social Gatherings with Friends and Family:      Attends Sabianist Services:      Active Member of Clubs or Organizations:      Attends Club or Organization Meetings:      Marital Status:    Intimate Partner Violence:      Fear of Current or Ex-Partner:      Emotionally Abused:      Physically Abused:      Sexually Abused:              Family History:   See above.          Allergies:      Allergies   Allergen Reactions     Gabapentin Swelling     Swelling in the feet             Medications:     Current Outpatient Medications:      albuterol (PROAIR HFA/PROVENTIL HFA/VENTOLIN HFA) 108 (90 Base) MCG/ACT inhaler, Inhale 1-2 puffs into the lungs, Disp: , Rfl:      aspirin (ASA) 325 MG tablet, Take 325 mg by mouth, Disp: , Rfl:      atorvastatin (LIPITOR) 20 MG tablet, Take 20  mg by mouth, Disp: , Rfl:      topiramate (TOPAMAX) 25 MG tablet, One a day x 1 week, then 1 twice a day x 1 week, then one in am and 2 at night, Disp: 90 tablet, Rfl: 2     beclomethasone HFA (QVAR REDIHALER) 80 MCG/ACT inhaler, Inhale 1 puff into the lungs (Patient not taking: Reported on 9/10/2021), Disp: , Rfl:            Physical Exam:   /73   Pulse 86   SpO2 98%    Physical Exam:   General: NAD  Neurologic:   Mental Status Exam: Alert, awake and oriented to situation. No dysarthria. Speech of normal fluency.   Cranial Nerves: Fundoscopic exam with clear disc margins bilaterally. PERRLA, EOMs intact, no nystagmus, facial movements symmetric, facial sensation intact to light touch, hearing intact to conversation, trapezius and SCMs 5/5 bilaterally, tongue midline and fully mobile. No tongue atrophy or fasciculations.    Motor: Normal tone in all four extremities, no atrophy or fasciculations. 5/5 strength bilaterally in shoulder abduction, elbow extensors and flexors, wrist extensors and flexors, hip flexors, knee extensors and flexors, dorsi- and plantarflexion. No tremors or abnormal movements noted.   Sensory: Sensation intact to light touch on arms and legs bilaterally.    Coordination: Finger-nose-finger intact bilaterally.  Rapidly alternating movements intact bilaterally in the upper extremities.  Normal finger tapping bilaterally.  Normal Romberg.   Reflexes: 2+ and symmetric in triceps, biceps, brachioradialis, patellar, Achilles, and plantars downgoing bilaterally.   Gait: Normal gait.  Able to toe and heel walk.  Tandem gait normal.  Head: Normocephalic, atraumatic. No radiating pain with palpation over the supraorbital notches, occipital nerves.  Temporal pulses intact.   Neck: Normal range of motion with lateral head movements and neck flexion.  Eyes: No conjunctival injection, no scleral icterus.   Respiratory: No increased work of breathing.  Cardiovascular: No lower extremity  edema.  Extremities: Warm, dry.          Data:     MRI brain (1/18/2021):  No radiographic evidence of acute infarcts. Better visualized chronic encephalomalacia and gliosis involving the lateral right frontal lobe as well the right superior temporal gyrus. This may represent residua from distant traumatic or ischemic insult. Mild supratentorial white matter change that is nonspecific but may be related to chronic small vessel ischemic change.    EEG normal        Again, thank you for allowing me to participate in the care of your patient.      Sincerely,    Milagros Casillas MD

## 2022-03-30 DIAGNOSIS — G43.119 INTRACTABLE MIGRAINE WITH AURA WITHOUT STATUS MIGRAINOSUS: ICD-10-CM

## 2022-03-30 RX ORDER — TOPIRAMATE 50 MG/1
50 TABLET, FILM COATED ORAL EVERY EVENING
Qty: 30 TABLET | Refills: 5 | Status: SHIPPED | OUTPATIENT
Start: 2022-03-30 | End: 2022-09-26

## 2022-03-30 NOTE — TELEPHONE ENCOUNTER
Rx Authorization:    Requested Medication/ Dose topiramate (TOPAMAX) 50 MG tablet    Date last refill ordered: 9/10/21    Quantity ordered: 30    # refills: 5    Date of last clinic visit with ordering provider: 9/10/21    Date of next clinic visit with ordering provider:     All pertinent protocol data (lab date/result):     Include pertinent information from patients message:

## 2022-05-06 ENCOUNTER — TELEPHONE (OUTPATIENT)
Dept: NEUROLOGY | Facility: CLINIC | Age: 47
End: 2022-05-06
Payer: COMMERCIAL

## 2022-05-06 NOTE — TELEPHONE ENCOUNTER
I called pt pharmacy; confirmed they have 5 refills of his topirmate prescription. They will get this ready for him to .     I called pt and let him know [facility] has refills and will get this ready for him today.     eMly MORALES

## 2022-05-06 NOTE — TELEPHONE ENCOUNTER
"   Health Call Center    Phone Message    May a detailed message be left on voicemail: yes     Reason for Call: Other: Pt needs a refill on RX \"Topamax\" generic. [facility]. In [place], Pt indicates they have only 1 pill left.      Action Taken: Message routed to:  [facility] ([facility] Neurology    Travel Screening: Not Applicable                                                                      "

## 2022-09-26 ENCOUNTER — TELEPHONE (OUTPATIENT)
Dept: NEUROLOGY | Facility: CLINIC | Age: 47
End: 2022-09-26

## 2022-09-26 ENCOUNTER — OFFICE VISIT (OUTPATIENT)
Dept: NEUROLOGY | Facility: CLINIC | Age: 47
End: 2022-09-26
Payer: COMMERCIAL

## 2022-09-26 VITALS
HEART RATE: 79 BPM | RESPIRATION RATE: 16 BRPM | SYSTOLIC BLOOD PRESSURE: 131 MMHG | OXYGEN SATURATION: 97 % | DIASTOLIC BLOOD PRESSURE: 84 MMHG

## 2022-09-26 DIAGNOSIS — G43.709 CHRONIC MIGRAINE WITHOUT AURA WITHOUT STATUS MIGRAINOSUS, NOT INTRACTABLE: Primary | ICD-10-CM

## 2022-09-26 DIAGNOSIS — G43.119 INTRACTABLE MIGRAINE WITH AURA WITHOUT STATUS MIGRAINOSUS: ICD-10-CM

## 2022-09-26 PROCEDURE — 99214 OFFICE O/P EST MOD 30 MIN: CPT | Performed by: PSYCHIATRY & NEUROLOGY

## 2022-09-26 RX ORDER — MELOXICAM 15 MG/1
TABLET ORAL
COMMUNITY
Start: 2022-03-21

## 2022-09-26 RX ORDER — IPRATROPIUM BROMIDE AND ALBUTEROL SULFATE 2.5; .5 MG/3ML; MG/3ML
SOLUTION RESPIRATORY (INHALATION)
COMMUNITY
Start: 2022-02-10

## 2022-09-26 RX ORDER — TOPIRAMATE 50 MG/1
50 TABLET, FILM COATED ORAL EVERY EVENING
Qty: 30 TABLET | Refills: 11 | Status: SHIPPED | OUTPATIENT
Start: 2022-09-26 | End: 2023-09-19

## 2022-09-26 ASSESSMENT — PAIN SCALES - GENERAL: PAINLEVEL: MILD PAIN (3)

## 2022-09-26 NOTE — TELEPHONE ENCOUNTER
Central Prior Authorization Team   Phone: [phone]      PRIOR AUTHORIZATION DENIED    Medication: Rimegepant Sulfate 75 MG TBDP - EPA DENIED     Denial Date:  09/26/2022    Denial Rational:   The request for coverage for Nurtec Tab 75mg Odt, use as directed (8 per month), is denied. This decision is based on health plan criteria for Nurtec. This medicine is covered only if:  You have a history of therapeutic failure (after at least 3 migraine episodes and a minimum of a 30-day trial), contraindication or intolerance to two of the following (document name and date tried):    (I) Almotriptan (Axert).  (II) Eletriptan (Relpax).  (III) Frovatriptan (Frova).  (IV) Naratriptan (Amerge).  (V) Rizatriptan (Maxalt/Maxalt MLT).  (VI) Sumatriptan (Imitrex).  (VII) Zolmitriptan (Zomig).    The information provided does not show that you meet the criteria listed above. *Please note: These products may require prior authorization.        Appeal Information:

## 2022-09-26 NOTE — PROGRESS NOTES
"[facility]    Headache Neurology Progress Note  September 26, 2022    Subjective:    Abhay Escudero returns for follow up of chronic migraine with sensory aura. He was last seen here one year ago and reports he has been feeling \"pretty good\". He had one severe migraine lasting 2-3 days in the past year and presented to the ED for workup out of concern for it being something more serious, workup was negative.     He estimates 20 headache days/month with about 3-5 severe headache days/month.     His headaches alternate in laterality and change from posterior to anterior intermittently. In severity, he rates normal headaches 3/10 and severe headaches 6-7/10. He denies sensory aura symptoms over the past year.    Daily topiramate 50 mg continues to help severity of headaches. He also takes 1 baby aspirin per day for headache prevention. He does not use naproxen often (less than twice/month), and last took it yesterday. He reports improvement of severe headaches w/naproxen but not resolution.    He reports increased stressors this past year associated with health concerns of his own and of loved ones, as well as with working long hours. He drinks 1-2 energy drinks per day.    Objective:    Vitals: /84   Pulse 79   Resp 16   SpO2 97%   General: Cooperative, NAD  Neurologic:  Mental Status: Fully alert, attentive and oriented to date, month, year, location. Speech clear and fluent.  Able to accurately give history and update regarding his fiancée, job, and change in insurance plan.  Cranial Nerves: Hearing intact to conversation.  Motor: No abnormal movements.  Strength intact in upper and lower extremities bilaterally.    Assessment/Plan:   Abhay Escudero is a 47 year old male who returns for follow up of chronic migraine with sensory aura.  This is complicated by family history of CADASIL in his mother and sister; he has not been tested.  He has had a previous infarct and has " white matter changes on MRI of the brain; he attributes previous stroke to methamphetamine use.     Previously, he developed sensory symptoms, concerning for sensory aura, and further evaluation into ischemic causes was pursued. He also underwent EEG to evaluate for seizure.  These investigations were unrevealing for new cerebral infarction or evidence of epilepsy.     Today he denies any sensory aura symptoms in the past year, including vision changes, tingling, numbness.     Regarding CADASIL, he is not interested in proceeding with genetic testing at this time.  -We reviewed that we could pursue genetic testing at any time in the future if desired.  He is not planning on having children with his fiancée.     We discussed a symptomatic treatment strategy for chronic migraine.  -For acute treatment, he will continue naproxen 220 mg as needed, not to exceed more than 14 days/month to avoid medication overuse.  -I recommend a trial of Nurtec 75 mg oral dissolvable tablet daily as needed for severe headache.  We will attempt to get preauthorization.  He reports he has a new insurance plan, and we will try to get this information updated.     His headache frequency and severity warrant prevention.  He will continue topiramate at 50 mg nightly for the time being.  -If topiramate is not tolerated or not effective in the future, verapamil, amitriptyline, a CGRP monoclonal antibody, or botulinum toxin injections could be considered.    I will plan to see him back in 1 year, or sooner if needed.  Of asked him to update me if Nurtec is not effective after trying it for 3-5 headaches.    Alyssa Zapien, MS3  [facility]    Physician Attestation   I, Milagros Casillas MD, saw this patient and agree with the findings and plan of care as documented in the note.      Milagros Casillas MD  Neurology

## 2022-09-26 NOTE — LETTER
"9/26/2022       RE: Abhay Escudero  [address]  [place] [address]     Dear Colleague,    Thank you for referring your patient, Abhay Escudero, to the [facility] [place] [facility]. Please see a copy of my visit note below.    [facility]    Headache Neurology Progress Note  September 26, 2022    Subjective:    Abhay Escudero returns for follow up of chronic migraine with sensory aura. He was last seen here one year ago and reports he has been feeling \"pretty good\". He had one severe migraine lasting 2-3 days in the past year and presented to the ED for workup out of concern for it being something more serious, workup was negative.     He estimates 20 headache days/month with about 3-5 severe headache days/month.     His headaches alternate in laterality and change from posterior to anterior intermittently. In severity, he rates normal headaches 3/10 and severe headaches 6-7/10. He denies sensory aura symptoms over the past year.    Daily topiramate 50 mg continues to help severity of headaches. He also takes 1 baby aspirin per day for headache prevention. He does not use naproxen often (less than twice/month), and last took it yesterday. He reports improvement of severe headaches w/naproxen but not resolution.    He reports increased stressors this past year associated with health concerns of his own and of loved ones, as well as with working long hours. He drinks 1-2 energy drinks per day.    Objective:    Vitals: /84   Pulse 79   Resp 16   SpO2 97%   General: Cooperative, NAD  Neurologic:  Mental Status: Fully alert, attentive and oriented to date, month, year, location. Speech clear and fluent.  Able to accurately give history and update regarding his fiancée, job, and change in insurance plan.  Cranial Nerves: Hearing intact to conversation.  Motor: No abnormal movements.  Strength " intact in upper and lower extremities bilaterally.    Assessment/Plan:   Abhay Escudero is a 47 year old male who returns for follow up of chronic migraine with sensory aura.  This is complicated by family history of CADASIL in his mother and sister; he has not been tested.  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     Previously, he developed sensory symptoms, concerning for sensory aura, and further evaluation into ischemic causes was pursued. He also underwent EEG to evaluate for seizure.  These investigations were unrevealing for new cerebral infarction or evidence of epilepsy.     Today he denies any sensory aura symptoms in the past year, including vision changes, tingling, numbness.     Regarding CADASIL, he is not interested in proceeding with genetic testing at this time.  -We reviewed that we could pursue genetic testing at any time in the future if desired.  He is not planning on having children with his fiancée.     We discussed a symptomatic treatment strategy for chronic migraine.  -For acute treatment, he will continue naproxen 220 mg as needed, not to exceed more than 14 days/month to avoid medication overuse.  -I recommend a trial of Nurtec 75 mg oral dissolvable tablet daily as needed for severe headache.  We will attempt to get preauthorization.  He reports he has a new insurance plan, and we will try to get this information updated.     His headache frequency and severity warrant prevention.  He will continue topiramate at 50 mg nightly for the time being.  -If topiramate is not tolerated or not effective in the future, verapamil, amitriptyline, a CGRP monoclonal antibody, or botulinum toxin injections could be considered.    I will plan to see him back in 1 year, or sooner if needed.  Of asked him to update me if Nurtec is not effective after trying it for 3-5 headaches.    Alyssa Zapien,  MS3  [facility]    Physician Attestation   I, Milagros Casillas MD, saw this patient and agree with the findings and plan of care as documented in the note.        Milagros Casillas MD  Neurology

## 2022-09-26 NOTE — TELEPHONE ENCOUNTER
Prior Authorization Retail Medication Request    Medication/Dose: Nurtec 75 mg  ICD code (if different than what is on RX):  Chronic migraine  Previously Tried and Failed:      Rationale:  He estimates 20 headache days/month with about 3-5 severe headache days/month.     Insurance Name:  [employer]  Insurance ID:  [id number]       Pharmacy Information (if different than what is on RX)  Name:    Phone:

## 2022-11-27 ENCOUNTER — HOSPITAL ENCOUNTER (EMERGENCY)
Facility: CLINIC | Age: 47
Discharge: HOME | End: 2022-11-27
Attending: EMERGENCY MEDICINE | Admitting: EMERGENCY MEDICINE
Payer: COMMERCIAL

## 2022-11-27 VITALS
WEIGHT: 220 LBS | OXYGEN SATURATION: 95 % | HEIGHT: 67 IN | BODY MASS INDEX: 34.53 KG/M2 | DIASTOLIC BLOOD PRESSURE: 69 MMHG | SYSTOLIC BLOOD PRESSURE: 127 MMHG | TEMPERATURE: 97 F | HEART RATE: 106 BPM | RESPIRATION RATE: 16 BRPM

## 2022-11-27 DIAGNOSIS — R11.2 NAUSEA AND VOMITING, UNSPECIFIED VOMITING TYPE: ICD-10-CM

## 2022-11-27 LAB
ALBUMIN SERPL-MCNC: 4.2 G/DL (ref 3.5–5)
ALP SERPL-CCNC: 89 U/L (ref 45–120)
ALT SERPL W P-5'-P-CCNC: 38 U/L (ref 0–45)
ANION GAP SERPL CALCULATED.3IONS-SCNC: 9 MMOL/L (ref 5–18)
AST SERPL W P-5'-P-CCNC: 16 U/L (ref 0–40)
BASOPHILS # BLD AUTO: 0 10E3/UL (ref 0–0.2)
BASOPHILS NFR BLD AUTO: 0 %
BILIRUB SERPL-MCNC: 0.8 MG/DL (ref 0–1)
BUN SERPL-MCNC: 16 MG/DL (ref 8–22)
CALCIUM SERPL-MCNC: 9.3 MG/DL (ref 8.5–10.5)
CHLORIDE BLD-SCNC: 109 MMOL/L (ref 98–107)
CO2 SERPL-SCNC: 21 MMOL/L (ref 22–31)
CREAT SERPL-MCNC: 0.97 MG/DL (ref 0.7–1.3)
EOSINOPHIL # BLD AUTO: 0.2 10E3/UL (ref 0–0.7)
EOSINOPHIL NFR BLD AUTO: 1 %
ERYTHROCYTE [DISTWIDTH] IN BLOOD BY AUTOMATED COUNT: 12 % (ref 10–15)
GFR SERPL CREATININE-BSD FRML MDRD: >90 ML/MIN/1.73M2
GLUCOSE BLD-MCNC: 142 MG/DL (ref 70–125)
HCT VFR BLD AUTO: 51.3 % (ref 40–53)
HGB BLD-MCNC: 17.9 G/DL (ref 13.3–17.7)
IMM GRANULOCYTES # BLD: 0.1 10E3/UL
IMM GRANULOCYTES NFR BLD: 0 %
LIPASE SERPL-CCNC: 10 U/L (ref 0–52)
LYMPHOCYTES # BLD AUTO: 0.6 10E3/UL (ref 0.8–5.3)
LYMPHOCYTES NFR BLD AUTO: 4 %
MCH RBC QN AUTO: 32.5 PG (ref 26.5–33)
MCHC RBC AUTO-ENTMCNC: 34.9 G/DL (ref 31.5–36.5)
MCV RBC AUTO: 93 FL (ref 78–100)
MONOCYTES # BLD AUTO: 0.7 10E3/UL (ref 0–1.3)
MONOCYTES NFR BLD AUTO: 5 %
NEUTROPHILS # BLD AUTO: 12.6 10E3/UL (ref 1.6–8.3)
NEUTROPHILS NFR BLD AUTO: 90 %
NRBC # BLD AUTO: 0 10E3/UL
NRBC BLD AUTO-RTO: 0 /100
PLATELET # BLD AUTO: 229 10E3/UL (ref 150–450)
POTASSIUM BLD-SCNC: 3.9 MMOL/L (ref 3.5–5)
PROT SERPL-MCNC: 8.5 G/DL (ref 6–8)
RBC # BLD AUTO: 5.5 10E6/UL (ref 4.4–5.9)
SODIUM SERPL-SCNC: 139 MMOL/L (ref 136–145)
WBC # BLD AUTO: 14.2 10E3/UL (ref 4–11)

## 2022-11-27 PROCEDURE — 36415 COLL VENOUS BLD VENIPUNCTURE: CPT | Performed by: EMERGENCY MEDICINE

## 2022-11-27 PROCEDURE — 85014 HEMATOCRIT: CPT | Performed by: EMERGENCY MEDICINE

## 2022-11-27 PROCEDURE — 250N000011 HC RX IP 250 OP 636: Performed by: EMERGENCY MEDICINE

## 2022-11-27 PROCEDURE — 96361 HYDRATE IV INFUSION ADD-ON: CPT

## 2022-11-27 PROCEDURE — 80053 COMPREHEN METABOLIC PANEL: CPT | Performed by: EMERGENCY MEDICINE

## 2022-11-27 PROCEDURE — 83690 ASSAY OF LIPASE: CPT | Performed by: EMERGENCY MEDICINE

## 2022-11-27 PROCEDURE — 258N000003 HC RX IP 258 OP 636: Performed by: EMERGENCY MEDICINE

## 2022-11-27 PROCEDURE — 96374 THER/PROPH/DIAG INJ IV PUSH: CPT

## 2022-11-27 PROCEDURE — 99284 EMERGENCY DEPT VISIT MOD MDM: CPT | Mod: 25

## 2022-11-27 PROCEDURE — 82040 ASSAY OF SERUM ALBUMIN: CPT | Performed by: EMERGENCY MEDICINE

## 2022-11-27 RX ORDER — ONDANSETRON 2 MG/ML
4 INJECTION INTRAMUSCULAR; INTRAVENOUS ONCE
Status: COMPLETED | OUTPATIENT
Start: 2022-11-27 | End: 2022-11-27

## 2022-11-27 RX ORDER — ONDANSETRON 4 MG/1
4 TABLET, ORALLY DISINTEGRATING ORAL EVERY 6 HOURS PRN
Qty: 12 TABLET | Refills: 0 | Status: SHIPPED | OUTPATIENT
Start: 2022-11-27 | End: 2022-11-30

## 2022-11-27 RX ADMIN — ONDANSETRON 4 MG: 2 INJECTION INTRAMUSCULAR; INTRAVENOUS at 08:00

## 2022-11-27 RX ADMIN — SODIUM CHLORIDE 1000 ML: 9 INJECTION, SOLUTION INTRAVENOUS at 08:00

## 2022-11-27 ASSESSMENT — ENCOUNTER SYMPTOMS
FEVER: 0
VOMITING: 1
COUGH: 0
DIARRHEA: 0
DIAPHORESIS: 1
ABDOMINAL PAIN: 0

## 2022-11-27 ASSESSMENT — ACTIVITIES OF DAILY LIVING (ADL): ADLS_ACUITY_SCORE: 33

## 2022-11-27 NOTE — Clinical Note
Abhay Escudero was seen and treated in our emergency department on 11/27/2022.  He may return to work on 11/29/2022.       If you have any questions or concerns, please don't hesitate to call.      Ohl, Shira WHIPPLE, DO

## 2022-11-27 NOTE — ED TRIAGE NOTES
Patient is here with emesis for a few hours. Was at the clinic a few days with girlfriend and exposed to virus there.

## 2022-11-27 NOTE — DISCHARGE INSTRUCTIONS
Zofran every 6 hours as needed for nausea and vomiting  Clear liquid diet today, small sips every 15 minutes, increase in frequency and amount as you tolerate  If this is a viral GI bug, may develop some diarrhea  Return if unable to keep anything down by mouth, high fevers, localized abdominal pain or any other concerns

## 2022-11-27 NOTE — ED PROVIDER NOTES
EMERGENCY DEPARTMENT ENCOUNTER      NAME: Abhay Escudero  AGE: 47 year old male  YOB: 1975  MRN: [id number]  EVALUATION DATE & TIME: No admission date for patient encounter.    PCP: Alberto Lagos    ED PROVIDER: Shira Eastman DO      Chief Complaint   Patient presents with     Vomiting         FINAL IMPRESSION:  1. Nausea and vomiting, unspecified vomiting type          ED COURSE & MEDICAL DECISION MAKING:    Pertinent Labs & Imaging studies reviewed. (See chart for details)  7:15 AM I met the patient and performed my initial interview and exam.  9:00 AM reassessed and updated on results.  He feels much improved.  He has no abdominal pain.  Discussed symptomatic care.  Heart rate now 105 bpm.  Discussed return if worsening symptoms, fever, abdominal pain.    47 year old male presents to the Emergency Department for evaluation of emesis.  About 5 episodes since waking this morning.  Last episode was blood-tinged.  Went to bed feeling fine.  No diarrhea.  No abdominal pain.  No fever.  Notes he was in the urgency room with his fiancée few days ago and worried about exposure to something there.  He is otherwise healthy.  He is tachycardic on arrival but nontoxic-appearing.  No reproducible abdominal pain to suggest ruptured ulcer, cholecystitis, pancreatitis, appendicitis.  Will obtain basic labs, give symptomatic care of IV fluids and Zofran.  Patient had significant improvement after the above therapies.  Heart rate improving.  Mild leukocytosis which could be explained by his vomiting.  No reproducible pain on abdominal exam again.  CT of the abdomen and/or ultrasound of the RUQ deferred given reassuring exam and labs.  We discussed symptomatic care.  Prescribed Zofran for nausea, encourage rehydration strategies.  Discussed return if acute worsening of symptoms, high fevers or localized abdominal pain.    At the conclusion of the encounter I discussed the results of all of the tests and the  disposition. The questions were answered. The patient or family acknowledged understanding and was agreeable with the care plan.     Medical Decision Making    History:    Supplemental history from: N/A    External Record(s) reviewed: Documented in HPI, if applicable.    Work Up:    Chart documentation includes differential considered and any EKGs or imaging interpreted by provider.    In additional to work up documented, I considered the following work up: See chart documentation, if applicable.    External consultation:    Discussion of management with another provider: See chart documentation, if applicable    Complicating factors:    Care impacted by chronic illness: None    Care affected by social determinants of health: N/A    Disposition considerations: Discharge. I prescribed additional prescription strength medication(s) as charted. I considered admission, but discharged patient after significant clinical improvement.    MEDICATIONS GIVEN IN THE EMERGENCY:  Medications   0.9% sodium chloride BOLUS (0 mLs Intravenous Stopped 11/27/22 0912)   ondansetron (ZOFRAN) injection 4 mg (4 mg Intravenous Given 11/27/22 0800)       NEW PRESCRIPTIONS STARTED AT TODAY'S ER VISIT  Discharge Medication List as of 11/27/2022  9:13 AM      START taking these medications    Details   ondansetron (ZOFRAN ODT) 4 MG ODT tab Take 1 tablet (4 mg) by mouth every 6 hours as needed for nausea, Disp-12 tablet, R-0, E-Prescribe                =================================================================    [facility]    Patient information was obtained from: Patient    Use of : N/A       Abhay CARLOTA Escudero is a 47 year old male with a pertinent history of GERD, obesity, substance abuse, and tobacco abuse who presents to this ED for evaluation of vomiting.    This morning the patient vomited five times. The last time had a little bit of blood in it. He also had sweating today, but he was able to eat some. He felt good yesterday  "night.     He was in the Urgency Room a few days ago with his fiancée and might have gotten exposed to some type of sickness.     He denies diarrhea, abdominal pain, fever, or cough.     He has his gall bladder.    REVIEW OF SYSTEMS   Review of Systems   Constitutional: Positive for diaphoresis. Negative for fever.   Respiratory: Negative for cough.    Gastrointestinal: Positive for vomiting (x5, once with blood). Negative for abdominal pain and diarrhea.   All other systems reviewed and are negative.      PAST MEDICAL HISTORY:  History reviewed. No pertinent past medical history.    PAST SURGICAL HISTORY:  History reviewed. No pertinent surgical history.        CURRENT MEDICATIONS:    ondansetron (ZOFRAN ODT) 4 MG ODT tab  albuterol (PROAIR HFA/PROVENTIL HFA/VENTOLIN HFA) 108 (90 Base) MCG/ACT inhaler  aspirin (ASA) 325 MG tablet  atorvastatin (LIPITOR) 20 MG tablet  beclomethasone HFA (QVAR REDIHALER) 80 MCG/ACT inhaler  BREO ELLIPTA 100-25 MCG/INH inhaler  ipratropium - albuterol 0.5 mg/2.5 mg/3 mL (DUONEB) 0.5-2.5 (3) MG/3ML neb solution  meloxicam (MOBIC) 15 MG tablet  Rimegepant Sulfate 75 MG TBDP  topiramate (TOPAMAX) 50 MG tablet         ALLERGIES:  Allergies   Allergen Reactions     Gabapentin Swelling     Swelling in the feet       FAMILY HISTORY:  History reviewed. No pertinent family history.    SOCIAL HISTORY:   Social History     Socioeconomic History     Marital status: Single   Tobacco Use     Smoking status: Former     Types: Cigarettes     Quit date: 2020     Years since quittin.9     Smokeless tobacco: Never   Substance and Sexual Activity     Alcohol use: Not Currently     Drug use: Never       VITALS:  /69   Pulse 106   Temp 97  F (36.1  C) (Temporal)   Resp 16   Ht 1.702 m (5' 7\")   Wt 99.8 kg (220 lb)   SpO2 95%   BMI 34.46 kg/m      PHYSICAL EXAM    Physical Exam  Constitutional:       General: He is not in acute distress.  HENT:      Head: Normocephalic and atraumatic. "      Mouth/Throat:      Pharynx: Oropharynx is clear.   Eyes:      Pupils: Pupils are equal, round, and reactive to light.   Cardiovascular:      Rate and Rhythm: Regular rhythm. Tachycardia present.      Pulses: Normal pulses.      Heart sounds: Normal heart sounds.   Pulmonary:      Effort: Pulmonary effort is normal.      Breath sounds: Normal breath sounds.   Abdominal:      General: Abdomen is flat. Bowel sounds are normal.      Palpations: Abdomen is soft.      Tenderness: There is no abdominal tenderness.   Musculoskeletal:         General: Normal range of motion.   Skin:     General: Skin is warm and dry.      Capillary Refill: Capillary refill takes less than 2 seconds.   Neurological:      General: No focal deficit present.      Mental Status: He is alert and oriented to person, place, and time.          LAB:  All pertinent labs reviewed and interpreted.  Labs Ordered and Resulted from Time of ED Arrival to Time of ED Departure   COMPREHENSIVE METABOLIC PANEL - Abnormal       Result Value    Sodium 139      Potassium 3.9      Chloride 109 (*)     Carbon Dioxide (CO2) 21 (*)     Anion Gap 9      Urea Nitrogen 16      Creatinine 0.97      Calcium 9.3      Glucose 142 (*)     Alkaline Phosphatase 89      AST 16      ALT 38      Protein Total 8.5 (*)     Albumin 4.2      Bilirubin Total 0.8      GFR Estimate >90     CBC WITH PLATELETS AND DIFFERENTIAL - Abnormal    WBC Count 14.2 (*)     RBC Count 5.50      Hemoglobin 17.9 (*)     Hematocrit 51.3      MCV 93      MCH 32.5      MCHC 34.9      RDW 12.0      Platelet Count 229      % Neutrophils 90      % Lymphocytes 4      % Monocytes 5      % Eosinophils 1      % Basophils 0      % Immature Granulocytes 0      NRBCs per 100 WBC 0      Absolute Neutrophils 12.6 (*)     Absolute Lymphocytes 0.6 (*)     Absolute Monocytes 0.7      Absolute Eosinophils 0.2      Absolute Basophils 0.0      Absolute Immature Granulocytes 0.1      Absolute NRBCs 0.0     LIPASE -  Normal    Lipase 10         I, Marv Acuña, am serving as a scribe to document services personally performed by Dr. Shira Eastman based on my observation and the provider's statements to me. I, Shira Eastman, DO attest that Marv Acuña is acting in a scribe capacity, has observed my performance of the services and has documented them in accordance with my direction.    Shira Eastman DO  Emergency Medicine  [facility] EMERGENCY ROOM  [address] [facility] [address]  [phone]  Dept: [phone]     Shira Eastman DO  11/27/22 1037

## 2023-09-19 DIAGNOSIS — G43.119 INTRACTABLE MIGRAINE WITH AURA WITHOUT STATUS MIGRAINOSUS: ICD-10-CM

## 2023-09-20 RX ORDER — TOPIRAMATE 50 MG/1
50 TABLET, FILM COATED ORAL EVERY EVENING
Qty: 30 TABLET | Refills: 1 | Status: SHIPPED | OUTPATIENT
Start: 2023-09-20 | End: 2024-01-04

## 2023-09-20 NOTE — TELEPHONE ENCOUNTER
Last Clinic Visit:  9/26/22  NV: NONE   Scheduling has been notified to contact the pt for appointment.

## 2023-12-11 DIAGNOSIS — G43.119 INTRACTABLE MIGRAINE WITH AURA WITHOUT STATUS MIGRAINOSUS: ICD-10-CM

## 2023-12-14 NOTE — TELEPHONE ENCOUNTER
topiramate (TOPAMAX) 50 MG tablet   30 tablet 1 9/20/2023     Last Office Visit : 9-  Future Office visit:  none    Anti-Seizure Meds Protocol  [id number]/11/2023 07:44 AM   Protocol Details Recent (12 mo) or future (30 days) visit within the authorizing provider's specialty    Review Authorizing provider's last note.    Normal CBC on file in past 26 months     Labs completed on :11-  Chloride  98 - 107 mmol/L 109 High

## 2023-12-15 RX ORDER — TOPIRAMATE 50 MG/1
50 TABLET, FILM COATED ORAL EVERY EVENING
Qty: 30 TABLET | OUTPATIENT
Start: 2023-12-15

## 2024-01-04 ENCOUNTER — TELEPHONE (OUTPATIENT)
Dept: NEUROLOGY | Facility: CLINIC | Age: 49
End: 2024-01-04

## 2024-01-04 DIAGNOSIS — G43.119 INTRACTABLE MIGRAINE WITH AURA WITHOUT STATUS MIGRAINOSUS: ICD-10-CM

## 2024-01-04 RX ORDER — TOPIRAMATE 50 MG/1
50 TABLET, FILM COATED ORAL EVERY EVENING
Qty: 30 TABLET | Refills: 5 | Status: SHIPPED | OUTPATIENT
Start: 2024-01-04 | End: 2024-06-03

## 2024-01-04 NOTE — CONFIDENTIAL NOTE
Rx Authorization:  Requested Medication/ Dose: Topamax 50MG tabs  Date last refill ordered: 9/20/23  Quantity ordered: 30  # refills: 1  Date of last clinic visit with ordering provider: 9/26/22  Date of next clinic visit with ordering provider: 6/23/24  All pertinent protocol data (lab date/result):   Include pertinent information from patients message:

## 2024-01-04 NOTE — TELEPHONE ENCOUNTER
Health Call Center    Phone Message    May a detailed message be left on voicemail: yes     Reason for Call: Medication Refill Request    Has the patient contacted the pharmacy for the refill? Yes   Name of medication being requested: topiramate (TOPAMAX) 50 MG tablet [[id number]] (Order [id number])       Provider who prescribed the medication:     Milagros Casillas MD     Pharmacy: [facility] Address: [address] [place] [address]  Date medication is needed: 01/04/2024   Please call Abhay @ [phone] to discuss further.    Action Taken: Message routed to:  [facility] ([facility] Neurology    Travel Screening: Not Applicable

## 2024-06-03 ENCOUNTER — OFFICE VISIT (OUTPATIENT)
Dept: NEUROLOGY | Facility: CLINIC | Age: 49
End: 2024-06-03
Payer: COMMERCIAL

## 2024-06-03 VITALS
BODY MASS INDEX: 34.46 KG/M2 | SYSTOLIC BLOOD PRESSURE: 123 MMHG | OXYGEN SATURATION: 98 % | DIASTOLIC BLOOD PRESSURE: 74 MMHG | WEIGHT: 220 LBS | HEART RATE: 83 BPM

## 2024-06-03 DIAGNOSIS — G43.119 INTRACTABLE MIGRAINE WITH AURA WITHOUT STATUS MIGRAINOSUS: ICD-10-CM

## 2024-06-03 PROCEDURE — 99213 OFFICE O/P EST LOW 20 MIN: CPT | Performed by: PSYCHIATRY & NEUROLOGY

## 2024-06-03 PROCEDURE — G2211 COMPLEX E/M VISIT ADD ON: HCPCS | Performed by: PSYCHIATRY & NEUROLOGY

## 2024-06-03 RX ORDER — TOPIRAMATE 50 MG/1
50 TABLET, FILM COATED ORAL EVERY EVENING
Qty: 90 TABLET | Refills: 3 | Status: SHIPPED | OUTPATIENT
Start: 2024-06-03

## 2024-06-03 ASSESSMENT — MIGRAINE DISABILITY ASSESSMENT (MIDAS)
HOW OFTEN WERE SOCIAL ACTIVITIES MISSED DUE TO HEADACHES: 0
HOW MANY DAYS IN THE PAST 3 MONTHS HAVE YOU HAD A HEADACHE: 0
HOW MANY DAYS DID YOU MISS WORK OR SCHOOL BECAUSE OF HEADACHES: 0
HOW MANY DAYS DID YOU NOT DO HOUSEWORK BECAUSE OF HEADACHES: 0
ON A SCALE FROM 0-10 ON AVERAGE HOW PAINFUL WERE HEADACHES: 1
HOW MANY DAYS WAS YOUR PRODUCTIVITY CUT IN HALF BECAUSE OF HEADACHES: 0
HOW MANY DAYS WAS HOUSEWORK PRODUCTIVITY CUT IN HALF DUE TO HEADACHES: 0
TOTAL SCORE: 0

## 2024-06-03 ASSESSMENT — HEADACHE IMPACT TEST (HIT 6)
HOW OFTEN DO HEADACHES LIMIT YOUR DAILY ACTIVITIES: NEVER
HOW OFTEN DID HEADACHS LIMIT CONCENTRATION ON WORK OR DAILY ACTIVITY: RARELY
WHEN YOU HAVE HEADACHES HOW OFTEN IS THE PAIN SEVERE: RARELY
WHEN YOU HAVE A HEADACHE HOW OFTEN DO YOU WISH YOU COULD LIE DOWN: RARELY
HIT6 TOTAL SCORE: 42
HOW OFTEN HAVE YOU FELT TOO TIRED TO WORK BECAUSE OF YOUR HEADACHES: NEVER
HOW OFTEN HAVE YOU FELT FED UP OR IRRITATED BECAUSE OF YOUR HEADACHES: NEVER
WHEN YOU HAVE HEADACHES HOW OFTEN IS THE PAIN SEVERE: RARELY
HOW OFTEN DO HEADACHES LIMIT YOUR DAILY ACTIVITIES: NEVER
HOW OFTEN HAVE YOU FELT TOO TIRED TO WORK BECAUSE OF YOUR HEADACHES: NEVER
HIT6 TOTAL SCORE: 42
HOW OFTEN DID HEADACHS LIMIT CONCENTRATION ON WORK OR DAILY ACTIVITY: RARELY
WHEN YOU HAVE A HEADACHE HOW OFTEN DO YOU WISH YOU COULD LIE DOWN: RARELY
HOW OFTEN HAVE YOU FELT FED UP OR IRRITATED BECAUSE OF YOUR HEADACHES: NEVER

## 2024-06-03 NOTE — PROGRESS NOTES
[facility]    Headache Neurology Progress Note  Cookie 3, 2024    Subjective:    Abhay Escudero returns for follow up of chronic migraine with sensory aura. I last saw him on September 26th, 2022. At that time, he continued on topiramate 50 mg nightly for migraine prevention and naproxen as needed; Nurtec trial was offered.    He reports headaches as frontal or over top of head. No change in quality.    He estimates he has 30 headache days per month, with 0 severe headache days per month.    For treatment, he takes aspirin as needed, breathing, lays down, stays hydrated.    Topiramate 50 mg nightly is helpful for headache.    Nurtec wasn't covered by insurance.    Stress is a trigger; fiance with health issues, lost job, recent move.    He continues to work in . Not missing work due to headache. Part of right pinky traumatically amputated in last week at work.    Objective:    Vitals: /74 (BP Location: Right arm, Patient Position: Sitting, Cuff Size: Adult Regular)   Pulse 83   Wt 99.8 kg (220 lb)   SpO2 98%   BMI 34.46 kg/m    General: Cooperative, NAD  Neurologic:  Mental Status: Fully alert, attentive and oriented. Speech clear and fluent.   Cranial Nerves: Facial movements symmetric.   Motor: No abnormal movements.      Pertinent Investigations:    Head CT wo contrast (3/13/2021):  No acute intracranial process. Chronic focal area of encephalomalacia within the right inferolateral frontal lobe.         6/3/2024     7:53 AM   HIT-6   When you have headaches, how often is the pain severe 8   How often do headaches limit your ability to do usual daily activities including household work, work, school, or social activities? 6   When you have a headache, how often do you wish you could lie down? 8   In the past 4 weeks, how often have you felt too tired to do work or daily activities because of your headaches 6   In the past 4 weeks, how often have you felt fed up  or irritated because of your headaches 6   In the past 4 weeks, how often did headaches limit your ability to concentrate on work or daily activities 8   HIT-6 Total Score 42           6/3/2024     7:52 AM   MIDAS - in the past three months:   On how many days did you miss work or school because of your headaches? 0   How many days was your productivity at work or school reduced by half or more because of your headaches? 0   On how many days did you not do household work because of your headaches? 0   How many days was your productivity in household work reduced by half or more because of your headaches? 0   On how many days did you miss family, social, or leisure activities because of your headaches? 0   On how many days did you have a headache? 0   On a scale of 0-10, on average how painful were these headaches? 1   MIDAS Score 0 (I - Little or No Disability)      Assessment/Plan:   Abhay Escudero is a 49 year old man who returns for follow up of chronic migraine with sensory aura.  This is complicated by family history of CADASIL in his mother and sister; he has not been tested.  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     Previous sensory symptoms are thought to be representative of sensory aura.  Investigations with imaging and EEG were unrevealing for new cerebral infarction or evidence of epilepsy.      Today he denies any sensory aura symptoms in the past year.  He continues to have mild daily headache, and with topiramate nightly, does not get migraine attacks.     Regarding CADASIL, he is not interested in proceeding with genetic testing.     We discussed a symptomatic treatment strategy for chronic migraine.  -For acute treatment, he we will continue aspirin as needed.     His headache frequency and severity warrant prevention.    -He will continue topiramate at 50 mg nightly.  -If topiramate is not tolerated  or not effective in the future, verapamil, amitriptyline, a CGRP monoclonal antibody, or botulinum toxin injections could be considered.     I will plan to see him back in 1 year, or sooner if needed.     The longitudinal plan of care for Abhay was addressed during this visit. Due to the added complexity in care, I will continue to support Abhay in the subsequent management of this condition(s) and with the ongoing continuity of care of this condition(s).     Milagros Casillas MD  Neurology

## 2024-06-03 NOTE — LETTER
6/3/2024       RE: Abhay Escudero  [address]  [place] [address]     Dear Colleague,    Thank you for referring your patient, Abhay Escudero, to the [facility] [place] [facility]. Please see a copy of my visit note below.    [facility]    Headache Neurology Progress Note  Cookie 3, 2024    Subjective:    Abhay Escudero returns for follow up of chronic migraine with sensory aura. I last saw him on September 26th, 2022. At that time, he continued on topiramate 50 mg nightly for migraine prevention and naproxen as needed; Nurtec trial was offered.    He reports headaches as frontal or over top of head. No change in quality.    He estimates he has 30 headache days per month, with 0 severe headache days per month.    For treatment, he takes aspirin as needed, breathing, lays down, stays hydrated.    Topiramate 50 mg nightly is helpful for headache.    Nurtec wasn't covered by insurance.    Stress is a trigger; fiance with health issues, lost job, recent move.    He continues to work in . Not missing work due to headache. Part of right pinky traumatically amputated in last week at work.    Objective:    Vitals: /74 (BP Location: Right arm, Patient Position: Sitting, Cuff Size: Adult Regular)   Pulse 83   Wt 99.8 kg (220 lb)   SpO2 98%   BMI 34.46 kg/m    General: Cooperative, NAD  Neurologic:  Mental Status: Fully alert, attentive and oriented. Speech clear and fluent.   Cranial Nerves: Facial movements symmetric.   Motor: No abnormal movements.      Pertinent Investigations:    Head CT wo contrast (3/13/2021):  No acute intracranial process. Chronic focal area of encephalomalacia within the right inferolateral frontal lobe.         6/3/2024     7:53 AM   HIT-6   When you have headaches, how often is the pain severe 8   How often do headaches limit your ability to do usual daily  activities including household work, work, school, or social activities? 6   When you have a headache, how often do you wish you could lie down? 8   In the past 4 weeks, how often have you felt too tired to do work or daily activities because of your headaches 6   In the past 4 weeks, how often have you felt fed up or irritated because of your headaches 6   In the past 4 weeks, how often did headaches limit your ability to concentrate on work or daily activities 8   HIT-6 Total Score 42           6/3/2024     7:52 AM   MIDAS - in the past three months:   On how many days did you miss work or school because of your headaches? 0   How many days was your productivity at work or school reduced by half or more because of your headaches? 0   On how many days did you not do household work because of your headaches? 0   How many days was your productivity in household work reduced by half or more because of your headaches? 0   On how many days did you miss family, social, or leisure activities because of your headaches? 0   On how many days did you have a headache? 0   On a scale of 0-10, on average how painful were these headaches? 1   MIDAS Score 0 (I - Little or No Disability)      Assessment/Plan:   Abhay Escudero is a 49 year old man who returns for follow up of chronic migraine with sensory aura.  This is complicated by family history of CADASIL in his mother and sister; he has not been tested.  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     Previous sensory symptoms are thought to be representative of sensory aura.  Investigations with imaging and EEG were unrevealing for new cerebral infarction or evidence of epilepsy.      Today he denies any sensory aura symptoms in the past year.  He continues to have mild daily headache, and with topiramate nightly, does not get migraine attacks.     Regarding CADASIL, he is not  interested in proceeding with genetic testing.     We discussed a symptomatic treatment strategy for chronic migraine.  -For acute treatment, he we will continue aspirin as needed.     His headache frequency and severity warrant prevention.    -He will continue topiramate at 50 mg nightly.  -If topiramate is not tolerated or not effective in the future, verapamil, amitriptyline, a CGRP monoclonal antibody, or botulinum toxin injections could be considered.     I will plan to see him back in 1 year, or sooner if needed.     The longitudinal plan of care for Abhay was addressed during this visit. Due to the added complexity in care, I will continue to support Abhay in the subsequent management of this condition(s) and with the ongoing continuity of care of this condition(s).         Again, thank you for allowing me to participate in the care of your patient.      Sincerely,    Milagros Casillas MD